# Patient Record
Sex: MALE | Employment: OTHER | URBAN - METROPOLITAN AREA
[De-identification: names, ages, dates, MRNs, and addresses within clinical notes are randomized per-mention and may not be internally consistent; named-entity substitution may affect disease eponyms.]

---

## 2019-09-17 ENCOUNTER — TRANSFERRED RECORDS (OUTPATIENT)
Dept: HEALTH INFORMATION MANAGEMENT | Facility: CLINIC | Age: 34
End: 2019-09-17

## 2019-11-19 ENCOUNTER — OFFICE (OUTPATIENT)
Dept: URBAN - METROPOLITAN AREA CLINIC 6 | Facility: CLINIC | Age: 34
End: 2019-11-19

## 2019-11-19 VITALS
HEIGHT: 75 IN | DIASTOLIC BLOOD PRESSURE: 74 MMHG | HEART RATE: 76 BPM | SYSTOLIC BLOOD PRESSURE: 118 MMHG | WEIGHT: 182 LBS | TEMPERATURE: 96.7 F

## 2019-11-19 DIAGNOSIS — F41.9 ANXIETY: ICD-10-CM

## 2019-11-19 DIAGNOSIS — R19.7: ICD-10-CM

## 2019-11-19 PROCEDURE — 99205 OFFICE O/P NEW HI 60 MIN: CPT | Performed by: SPECIALIST

## 2019-11-19 NOTE — SERVICEHPINOTES
33-year-old male presents with multiple, vague postprandial complaints. He complains of feeling uneasy and fatigued after he eats. He denies any abdominal pain, nausea, vomiting, change in bowel pattern, blood in stool, change in weight or appetite. He did note occasional diarrhea with the intake of cheese or coconut water which, was recommended by his cardiologist for posture tachycardia. The patient has been tested negative for Celiac disease.

## 2020-01-14 ENCOUNTER — TRANSFERRED RECORDS (OUTPATIENT)
Dept: HEALTH INFORMATION MANAGEMENT | Facility: CLINIC | Age: 35
End: 2020-01-14

## 2020-04-30 ENCOUNTER — OFFICE (OUTPATIENT)
Dept: URBAN - METROPOLITAN AREA CLINIC 13 | Facility: CLINIC | Age: 35
End: 2020-04-30

## 2020-04-30 VITALS — HEIGHT: 75 IN | WEIGHT: 191 LBS

## 2020-04-30 DIAGNOSIS — R53.81 MALAISE: ICD-10-CM

## 2020-04-30 DIAGNOSIS — R14.0 ABDOMINAL BLOATING: ICD-10-CM

## 2020-04-30 PROCEDURE — 99215 OFFICE O/P EST HI 40 MIN: CPT | Performed by: INTERNAL MEDICINE

## 2020-04-30 PROCEDURE — G0406 INPT/TELE FOLLOW UP 15: HCPCS | Performed by: INTERNAL MEDICINE

## 2020-04-30 NOTE — SERVICEHPINOTES
The patient is scheduled today for a telehealth visit, due to current mandate for social distancing on account of the COVID-19 Pandemic. The clinician is connected to a secure network. The patient consents to this teleconference being a billable service.   This visit used [employer] for a live, interactive audio and video telehealth visit.   Allison patient wishes to explore the possibility of gastrointestinal issues being associated with his reported cardiac diagnosis of tachycardia/arrhythmia. The patient for some time has had postprandial bloating after every meal. He states that “food is not digested at the speed one would expect”. He also feels fatigued and has general malaise after eating. After eating he often has the sensation of palpitations or his heart racing. He may have some associated chest discomfort.Onesimo states that his diet is poor. He often eats fast food and pizza. He is under a fair amount of work-related stress. He is at a computer most of the day. He often eats hunched over and does not attempt to walk after a meal. He recently started seeing a nutritionist and will try to improve his diet. He has also begun an exercise regimen. Ysabel appetite is fair. His way to his overall stable. He has no fever, chills, jaundice, shortness of breath, dysphagia, nausea, vomiting, abdominal pain, dysuria, hematuria, melena or hematochezia. His bowel pattern is usually regular. Allison patient reports that he underwent an upper endoscopy at about age 7 which was said to show “esophageal nodules”. Onesimo states he has had blood tests done with his primary care physician recently. Onesimo reports that his father was diagnosed with and  of colon cancer at age 67. Allison patient was seen by Deandre Jones MD, a gastroenterologist in [place], in 2019. The patient was recommended to have blood tests and stool studies done at that time. He did not comply with those recommendations. RODERICK

## 2020-07-28 ENCOUNTER — TRANSFERRED RECORDS (OUTPATIENT)
Dept: HEALTH INFORMATION MANAGEMENT | Facility: CLINIC | Age: 35
End: 2020-07-28

## 2020-10-07 ENCOUNTER — TRANSFERRED RECORDS (OUTPATIENT)
Dept: HEALTH INFORMATION MANAGEMENT | Facility: CLINIC | Age: 35
End: 2020-10-07

## 2020-10-30 ENCOUNTER — TRANSFERRED RECORDS (OUTPATIENT)
Dept: HEALTH INFORMATION MANAGEMENT | Facility: CLINIC | Age: 35
End: 2020-10-30

## 2020-11-03 ENCOUNTER — TRANSFERRED RECORDS (OUTPATIENT)
Dept: HEALTH INFORMATION MANAGEMENT | Facility: CLINIC | Age: 35
End: 2020-11-03

## 2020-11-04 ENCOUNTER — TRANSFERRED RECORDS (OUTPATIENT)
Dept: HEALTH INFORMATION MANAGEMENT | Facility: CLINIC | Age: 35
End: 2020-11-04

## 2020-11-05 ENCOUNTER — TRANSFERRED RECORDS (OUTPATIENT)
Dept: HEALTH INFORMATION MANAGEMENT | Facility: CLINIC | Age: 35
End: 2020-11-05

## 2020-11-11 ENCOUNTER — TRANSFERRED RECORDS (OUTPATIENT)
Dept: HEALTH INFORMATION MANAGEMENT | Facility: CLINIC | Age: 35
End: 2020-11-11

## 2020-11-13 ENCOUNTER — TRANSFERRED RECORDS (OUTPATIENT)
Dept: HEALTH INFORMATION MANAGEMENT | Facility: CLINIC | Age: 35
End: 2020-11-13

## 2020-12-11 ENCOUNTER — TRANSFERRED RECORDS (OUTPATIENT)
Dept: HEALTH INFORMATION MANAGEMENT | Facility: CLINIC | Age: 35
End: 2020-12-11

## 2020-12-28 ENCOUNTER — TRANSFERRED RECORDS (OUTPATIENT)
Dept: HEALTH INFORMATION MANAGEMENT | Facility: CLINIC | Age: 35
End: 2020-12-28

## 2020-12-29 ENCOUNTER — TRANSFERRED RECORDS (OUTPATIENT)
Dept: HEALTH INFORMATION MANAGEMENT | Facility: CLINIC | Age: 35
End: 2020-12-29

## 2021-01-08 ENCOUNTER — TRANSFERRED RECORDS (OUTPATIENT)
Dept: HEALTH INFORMATION MANAGEMENT | Facility: CLINIC | Age: 36
End: 2021-01-08

## 2021-02-12 ENCOUNTER — TRANSFERRED RECORDS (OUTPATIENT)
Dept: HEALTH INFORMATION MANAGEMENT | Facility: CLINIC | Age: 36
End: 2021-02-12

## 2021-03-29 ENCOUNTER — TRANSFERRED RECORDS (OUTPATIENT)
Dept: HEALTH INFORMATION MANAGEMENT | Facility: CLINIC | Age: 36
End: 2021-03-29

## 2021-04-02 ENCOUNTER — TRANSFERRED RECORDS (OUTPATIENT)
Dept: HEALTH INFORMATION MANAGEMENT | Facility: CLINIC | Age: 36
End: 2021-04-02

## 2021-04-27 ENCOUNTER — TELEMEDICINE (OUTPATIENT)
Dept: VASCULAR SURGERY | Facility: CLINIC | Age: 36
End: 2021-04-27
Payer: COMMERCIAL

## 2021-04-27 DIAGNOSIS — R60.0 LOCALIZED EDEMA: Primary | ICD-10-CM

## 2021-04-27 PROCEDURE — 99203 OFFICE O/P NEW LOW 30 MIN: CPT | Performed by: NURSE PRACTITIONER

## 2021-04-27 RX ORDER — PANTOPRAZOLE SODIUM 40 MG/1
40 TABLET, DELAYED RELEASE ORAL
COMMUNITY
Start: 2020-12-15

## 2021-04-27 RX ORDER — LEVOCETIRIZINE DIHYDROCHLORIDE 5 MG/1
10 TABLET, FILM COATED ORAL DAILY
COMMUNITY

## 2021-04-27 RX ORDER — TRAMADOL HYDROCHLORIDE 50 MG/1
50 TABLET ORAL EVERY 6 HOURS PRN
COMMUNITY
Start: 2021-02-12

## 2021-04-27 RX ORDER — ESZOPICLONE 2 MG/1
2 TABLET, FILM COATED ORAL DAILY
COMMUNITY
Start: 2020-12-01

## 2021-04-27 RX ORDER — METHYLPREDNISOLONE 32 MG/1
TABLET ORAL
COMMUNITY
Start: 2021-01-21

## 2021-04-27 NOTE — ASSESSMENT & PLAN NOTE
28year old male with POTS called for telemedicine/virtual visit for vascular evaluation for lower extremity edema  -He has undergone an extensive workup for bilateral upper and lower extremity fatigue and brain fog with multiple specialists at multiple different hospital centers  -Upper and lower extremity arterial studies without any evidence of arterial occlusive disease     -CTA abd/pelvis and runoff at OSH without evidence of vasculitis, arterial disease or aneurysmal disease   -We primarily focused on vascular reasons for bilateral lower extremity edema    -Thus far studies have been focused on arterial system   -Perhaps could evaluate with venous reflux study to further evaluate for venous insufficiency though no correlation to POTS or upper extremity symptoms   -Patient not currently local to [place] therefore no studies were ordered   -This was a limited evaluation due to virtual platform
Hypertension, unspecified type
Hypertension, unspecified type
Hypothyroidism

## 2021-04-27 NOTE — PROGRESS NOTES
Virtual Regular Visit      Assessment/Plan:    Problem List Items Addressed This Visit        Other    Localized edema - Primary     28year old male with POTS called for telemedicine/virtual visit for vascular evaluation for lower extremity edema  -He has undergone an extensive workup for bilateral upper and lower extremity fatigue and brain fog with multiple specialists at multiple different hospital centers  -Upper and lower extremity arterial studies without any evidence of arterial occlusive disease  -CTA abd/pelvis and runoff at OSH without evidence of vasculitis, arterial disease or aneurysmal disease   -We primarily focused on vascular reasons for bilateral lower extremity edema    -Thus far studies have been focused on arterial system   -Perhaps could evaluate with venous reflux study to further evaluate for venous insufficiency though no correlation to POTS or upper extremity symptoms   -Patient not currently local to [place] therefore no studies were ordered   -This was a limited evaluation due to virtual platform                       Reason for visit is   Chief Complaint   Patient presents with    Virtual Regular Visit        Encounter provider VIRY Alvarez    Provider located at [address]  [phone]      Recent Visits  No visits were found meeting these conditions  Showing recent visits within past 7 days and meeting all other requirements     Today's Visits  Date Type Provider Dept   04/27/21 Telemedicine Radha King [address] [place] [address] today's visits and meeting all other requirements     Future Appointments  No visits were found meeting these conditions  Showing future appointments within next 150 days and meeting all other requirements        The patient was identified by name and date of birth   Carlos Eduardo Fernandez was informed that this is a telemedicine visit and that the visit is being conducted through [employer] and patient was informed that this is not a secure, HIPAA-compliant platform  He agrees to proceed     My office door was closed  No one else was in the room  He acknowledged consent and understanding of privacy and security of the video platform  The patient has agreed to participate and understands they can discontinue the visit at any time  Patient is aware this is a billable service  Subjective  Jas Mason is a 28 y o  male called for telemedicine visit   HPI   59-year-old male with [place] call for telemedicine visit for lower extremity edema  Patient has seen multiple specialists in multiple different hospital systems  He has had extensive testing to date  He sent over a 40 page document which was reviewed prior to telemedicine visit  He complains of fatigue and brain fog with rapid onset  He also notes edema of the anterior tibial lower extremity  He has some associated fatigue  He is 210 lb with a height of 6 foot 3 inches  He was evaluated at [facility] with CTA of abdomen pelvis and runoff which did not show any evidence of vasculitis or aneurysmal disease  Upper and lower extremity arterial studies were within normal limits  These were reviewed in the Care everywhere tab in Epic  He did not have any venous testing  He did have an elevated D-dimer level  History reviewed  No pertinent past medical history  History reviewed  No pertinent surgical history      Current Outpatient Medications   Medication Sig Dispense Refill    eszopiclone (LUNESTA) 2 mg tablet Take 2 mg by mouth daily      levocetirizine (XYZAL) 5 MG tablet Take 10 mg by mouth daily      methylPREDNISolone (MEDROL) 32 MG tablet Take 1 tablet (32 mg) by mouth 12 hours prior to exam then take 1 tablet (32 mg) 2 hours prior to exam       pantoprazole (PROTONIX) 40 mg tablet Take 40 mg by mouth      Suvorexant 20 MG TABS Take 20 mg by mouth daily      traMADol (ULTRAM) 50 mg tablet Take 50 mg by mouth every 6 (six) hours as needed       No current facility-administered medications for this visit  Allergies   Allergen Reactions    Peanut Oil - Food Allergy Other (See Comments)    Codeine GI Intolerance and Rash    Gadolinium Derivatives Hives    Iodinated Diagnostic Agents Hives     Gadolinium MRI contrast    Penicillins Rash and Other (See Comments)    Pollen Extract Other (See Comments)       Review of Systems    Video Exam    There were no vitals filed for this visit  Physical Exam     I spent 20 minutes directly with the patient during this visit      VIRTUAL VISIT DISCLAIMER    Dee Cotter acknowledges that he has consented to an online visit or consultation  He understands that the online visit is based solely on information provided by him, and that, in the absence of a face-to-face physical evaluation by the physician, the diagnosis he receives is both limited and provisional in terms of accuracy and completeness  This is not intended to replace a full medical face-to-face evaluation by the physician  Dee Cotter understands and accepts these terms

## 2021-06-08 NOTE — TELEPHONE ENCOUNTER
Action    Action Taken 6-8: Requested from [place]:    Autonomic relex screen    EMG    Holter    VO2 Exercise test    CT Chest   12-29-20, 11-3-20    11-13-20    11-6-20    11-6-20    11-13-20     6-10: sent to urgent scanning

## 2021-06-10 ENCOUNTER — PRE VISIT (OUTPATIENT)
Dept: CARDIOLOGY | Facility: CLINIC | Age: 36
End: 2021-06-10

## 2021-06-10 ENCOUNTER — VIRTUAL VISIT (OUTPATIENT)
Dept: CARDIOLOGY | Facility: CLINIC | Age: 36
End: 2021-06-10
Attending: INTERNAL MEDICINE
Payer: COMMERCIAL

## 2021-06-10 DIAGNOSIS — G90.A POTS (POSTURAL ORTHOSTATIC TACHYCARDIA SYNDROME): Primary | ICD-10-CM

## 2021-06-10 PROCEDURE — 99207 PR NO SHOW FOR SCHEDULED APPT: CPT | Mod: 95 | Performed by: INTERNAL MEDICINE

## 2021-06-10 NOTE — PROGRESS NOTES
Aniceto is a 35 year old who is being evaluated via a billable telephone visit.      What phone number would you like to be contacted at? [phone]   How would you like to obtain your AVS? Miki Vitals - Patient Reported  Pain Score: No Pain (0)(No SOB)    Pt stated his vitals were good and he isn't on any medication.  Vitals were taken and medication reconciled.  Pt doesn't want to use [employer] and would like to be called.    JEFFERY Ward  4:17 PM    No Show....called x3  ASHLY

## 2021-06-10 NOTE — LETTER
6/10/2021      RE: Aniceto Camp  [address] [place] [id number]       Dear Colleague,    Thank you for the opportunity to participate in the care of your patient, Aniceto Camp, at the [facility] HEART CLINIC [facility]. Please see a copy of my visit note below.    Aniceto is a 35 year old who is being evaluated via a billable telephone visit.      What phone number would you like to be contacted at? [phone]   How would you like to obtain your AVS? MyChart Vitals - Patient Reported  Pain Score: No Pain (0)(No SOB)    Pt stated his vitals were good and he isn't on any medication.  Vitals were taken and medication reconciled.  Pt doesn't want to use [employer] and would like to be called.    JEFFERY Ward  4:17 PM    No Show....called x3  DGB      Please do not hesitate to contact me if you have any questions/concerns.     Sincerely,     Kit Feliz MD

## 2021-06-14 ENCOUNTER — TRANSFERRED RECORDS (OUTPATIENT)
Dept: HEALTH INFORMATION MANAGEMENT | Facility: CLINIC | Age: 36
End: 2021-06-14

## 2021-06-27 ENCOUNTER — HEALTH MAINTENANCE LETTER (OUTPATIENT)
Age: 36
End: 2021-06-27

## 2021-07-15 ENCOUNTER — VIRTUAL VISIT (OUTPATIENT)
Dept: CARDIOLOGY | Facility: CLINIC | Age: 36
End: 2021-07-15
Attending: INTERNAL MEDICINE
Payer: COMMERCIAL

## 2021-07-15 ENCOUNTER — MYC MEDICAL ADVICE (OUTPATIENT)
Dept: CARDIOLOGY | Facility: CLINIC | Age: 36
End: 2021-07-15

## 2021-07-15 DIAGNOSIS — G90.9 AUTONOMIC DYSFUNCTION: Primary | ICD-10-CM

## 2021-07-15 DIAGNOSIS — Q79.60 EHLERS-DANLOS SYNDROME: ICD-10-CM

## 2021-07-15 PROCEDURE — 99204 OFFICE O/P NEW MOD 45 MIN: CPT | Mod: 95 | Performed by: INTERNAL MEDICINE

## 2021-07-15 NOTE — LETTER
Date:July 29, 2021      Patient was self referred, no letter generated. Do not send.        [facility] Health Information

## 2021-07-15 NOTE — PATIENT INSTRUCTIONS
You were seen virtually in the Electrophysiology Clinic today by: Dr Feliz      *Please call us with any questions or concerns     Your Care Team:  EP Cardiology   Telephone Number     Nurse Line  Hellen Guerrero RN [phone]     For scheduling appts or procedures:    Sobeida Tran   [phone]   For the Device Clinic (Pacemakers, ICDs, Loop Recorders)    During business hours: [phone]  After business hours:   [phone]- select option 4 and ask for job code [id number].     On-call cardiologist for after hours or on weekends: [phone], option #4, and ask to speak to the on-call cardiologist.     Cardiovascular Clinic:   [address] [facility] [place] [address]      As always, Thank you for trusting us with your health care needs!

## 2021-07-15 NOTE — LETTER
"7/15/2021      RE: Aniceto Camp  [address] [place] [address]       Dear Colleague,    Thank you for the opportunity to participate in the care of your patient, Aniceto Camp, at the [facility] HEART [facility]. Please see a copy of my visit note below.    Aniceto Camp is a 35 year old who is being evaluated via a billable telephone visit.      What phone number would you like to be contacted at? [phone]  How would you like to obtain your AVS? MyChart    Vitals - Patient Reported  Pain Score: No Pain (0) (No SOB)    Vitals were taken and medications reconciled.    Uziel Sanchez, EMT  3:40 PM    HPI:   Mr. 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  Patient was not specifically requesting care but was more interested in understanding the pathophysiology of the relationship between these conditions and in addition understanding the implications of previous testing which she has had at [facility] and [facility].  He provided a website that contains his medical testing and asked me to review it which I did.  It was approximately 20 minutes of website review of his prior laboratory studies.    At this visit he did not discuss any particular current symptoms despite given the opportunity to do so.  He was more interested in a general evaluation of the laboratory studies above in the setting of global understanding of a presumed autonomic dysfunction.  Should be pointed out that in terms of his specific testing he had undergone autonomic studies at [facility] in December 2020 which were reported as follows \"cardio vagal and cardiovascular adrenergic function are normal while postganglionic sympathetic pseudomotor function was not assessed.  There is evidence of asymptomatic orthostatic tachycardia which can be seen in " "deconditioning, dehydration, and as a constitutional trait in hyperadrenergic states\".  Adrenergic status was noted to have been measured and was on the website and did not show any marked abnormality.  Consequently was not clear that there is an underlying autonomic dysfunction and even more so it is unclear whether the the patient's symptoms (unstated at this visit) could be correlated with an autonomic disturbance.     close by requesting the names of individuals who might be interested in spending more time reviewing his data and providing consultation regarding possible greater understanding for him.  I suggested that he consider [place] [facility] or the [facility] rare diseases group but he was more inclined to avoid places where more testing would be necessary since he has undergone considerable testing in the past.  I indicated that I would try to supply name if one came to mind.      PAST MEDICAL HISTORY:  No past medical history on file.    CURRENT MEDICATIONS:  No current outpatient medications on file.       PAST SURGICAL HISTORY:  No past surgical history on file.    ALLERGIES:   No Known Allergies    FAMILY HISTORY:  No family history on file.      SOCIAL HISTORY:  Social History     Tobacco Use     Smoking status: Never Smoker     Smokeless tobacco: Never Used   Substance Use Topics     Alcohol use: None     Drug use: None       ROS:   10 point review of systems was not fully conducted    Exam:  Physical examination was not    Labs:  CBC RESULTS:   No results found for: WBC, RBC, HGB, HCT, MCV, MCH, MCHC, RDW, PLT    BMP RESULTS:  No results found for: NA, POTASSIUM, CHLORIDE, CO2, ANIONGAP, GLC, BUN, CR, GFRESTIMATED, GFRESTBLACK, ALLAN     INR RESULTS:  No results found for: INR    Procedures:  PULMONARY FUNCTION TESTS:   No flowsheet data found.      ECHOCARDIOGRAM:   No results found for this or any previous visit (from the past 8760 hour(s)).      Assessment and Plan:   1.  Presumed " autonomic dysfunction with atypical POTS/Evita-Danlos syndrome    Plan  1.  Solitary consultation with note follow-up the need    Total elapsed duration 45 minutes of which 20 minutes was spent reviewing website data related to patient 5 minutes with [facility].  10 minutes with patient and 10 minutes with documentation    [facility]  Patient at home; clinic [facility] heart    I  appreciated the opportunity to consult with Aniceto Camp . The note above summarizes my findings and current recommendations.        Kit Feliz MD  Cardiac Arrhythmia Service  [facility]  [phone]        CC  SELF, REFERRED        Please do not hesitate to contact me if you have any questions/concerns.     Sincerely,     Kit Feliz MD

## 2021-07-15 NOTE — PROGRESS NOTES
"Aniceto Camp is a 35 year old who is being evaluated via a billable telephone visit.      What phone number would you like to be contacted at? [phone]  How would you like to obtain your AVS? Miki    Vitals - Patient Reported  Pain Score: No Pain (0) (No SOB)    Vitals were taken and medications reconciled.    Uziel Sanchez, EMT  3:40 PM    HPI:   Mr. 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  Patient was not specifically requesting care but was more interested in understanding the pathophysiology of the relationship between these conditions and in addition understanding the implications of previous testing which she has had at [facility] and [facility].  He provided a website that contains his medical testing and asked me to review it which I did.  It was approximately 20 minutes of website review of his prior laboratory studies.    At this visit he did not discuss any particular current symptoms despite given the opportunity to do so.  He was more interested in a general evaluation of the laboratory studies above in the setting of global understanding of a presumed autonomic dysfunction.  Should be pointed out that in terms of his specific testing he had undergone autonomic studies at [facility] in December 2020 which were reported as follows \"cardio vagal and cardiovascular adrenergic function are normal while postganglionic sympathetic pseudomotor function was not assessed.  There is evidence of asymptomatic orthostatic tachycardia which can be seen in deconditioning, dehydration, and as a constitutional trait in hyperadrenergic states\".  Adrenergic status was noted to have been measured and was on the website and did not show any marked abnormality.  Consequently was not clear that there is an underlying autonomic dysfunction and even more so it is unclear whether the the patient's " symptoms (unstated at this visit) could be correlated with an autonomic disturbance.     close by requesting the names of individuals who might be interested in spending more time reviewing his data and providing consultation regarding possible greater understanding for him.  I suggested that he consider [place] [facility] or the [facility] rare diseases group but he was more inclined to avoid places where more testing would be necessary since he has undergone considerable testing in the past.  I indicated that I would try to supply name if one came to mind.      PAST MEDICAL HISTORY:  No past medical history on file.    CURRENT MEDICATIONS:  No current outpatient medications on file.       PAST SURGICAL HISTORY:  No past surgical history on file.    ALLERGIES:   No Known Allergies    FAMILY HISTORY:  No family history on file.      SOCIAL HISTORY:  Social History     Tobacco Use     Smoking status: Never Smoker     Smokeless tobacco: Never Used   Substance Use Topics     Alcohol use: None     Drug use: None       ROS:   10 point review of systems was not fully conducted    Exam:  Physical examination was not    Labs:  CBC RESULTS:   No results found for: WBC, RBC, HGB, HCT, MCV, MCH, MCHC, RDW, PLT    BMP RESULTS:  No results found for: NA, POTASSIUM, CHLORIDE, CO2, ANIONGAP, GLC, BUN, CR, GFRESTIMATED, GFRESTBLACK, ALLAN     INR RESULTS:  No results found for: INR    Procedures:  PULMONARY FUNCTION TESTS:   No flowsheet data found.      ECHOCARDIOGRAM:   No results found for this or any previous visit (from the past 8760 hour(s)).      Assessment and Plan:   1.  Presumed autonomic dysfunction with atypical POTS/Evita-Danlos syndrome    Plan  1.  Solitary consultation with note follow-up the need    Total elapsed duration 45 minutes of which 20 minutes was spent reviewing website data related to patient 5 minutes with [facility].  10 minutes with patient and 10 minutes with documentation    Telephone visit  platform [employer]  Patient at home; clinic [facility] heart    I  appreciated the opportunity to consult with Aniceto Camp . The note above summarizes my findings and current recommendations.        Kit Feliz MD  Cardiac Arrhythmia Service  [facility]  [phone]        CC  SELF, REFERRED

## 2021-10-17 ENCOUNTER — HEALTH MAINTENANCE LETTER (OUTPATIENT)
Age: 36
End: 2021-10-17

## 2022-07-24 ENCOUNTER — HEALTH MAINTENANCE LETTER (OUTPATIENT)
Age: 37
End: 2022-07-24

## 2022-10-03 ENCOUNTER — HEALTH MAINTENANCE LETTER (OUTPATIENT)
Age: 37
End: 2022-10-03

## 2023-08-12 ENCOUNTER — HEALTH MAINTENANCE LETTER (OUTPATIENT)
Age: 38
End: 2023-08-12